# Patient Record
Sex: FEMALE
[De-identification: names, ages, dates, MRNs, and addresses within clinical notes are randomized per-mention and may not be internally consistent; named-entity substitution may affect disease eponyms.]

---

## 2019-08-08 ENCOUNTER — HOSPITAL ENCOUNTER (EMERGENCY)
Dept: HOSPITAL 53 - M ED | Age: 19
Discharge: HOME | End: 2019-08-08
Payer: COMMERCIAL

## 2019-08-08 VITALS — DIASTOLIC BLOOD PRESSURE: 65 MMHG | SYSTOLIC BLOOD PRESSURE: 115 MMHG

## 2019-08-08 VITALS — WEIGHT: 153.33 LBS | BODY MASS INDEX: 24.07 KG/M2 | HEIGHT: 67 IN
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
## 2019-08-08 DIAGNOSIS — M62.830: ICD-10-CM

## 2019-08-08 DIAGNOSIS — Z88.0: ICD-10-CM

## 2019-08-08 DIAGNOSIS — S39.012A: Primary | ICD-10-CM
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
## 2019-08-08 PROCEDURE — 96372 THER/PROPH/DIAG INJ SC/IM: CPT

## 2019-08-08 PROCEDURE — 99283 EMERGENCY DEPT VISIT LOW MDM: CPT

## 2019-08-09 ENCOUNTER — HOSPITAL ENCOUNTER (EMERGENCY)
Dept: HOSPITAL 53 - M ED | Age: 19
Discharge: HOME | End: 2019-08-09
Payer: COMMERCIAL

## 2019-08-09 VITALS
BODY MASS INDEX: 24.74 KG/M2 | DIASTOLIC BLOOD PRESSURE: 64 MMHG | HEIGHT: 67 IN | WEIGHT: 157.63 LBS | SYSTOLIC BLOOD PRESSURE: 135 MMHG
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
## 2019-08-09 DIAGNOSIS — Z88.0: ICD-10-CM

## 2019-08-09 DIAGNOSIS — S39.012A: ICD-10-CM

## 2019-08-09 DIAGNOSIS — F17.210: ICD-10-CM

## 2019-08-09 DIAGNOSIS — S29.012A: Primary | ICD-10-CM

## 2019-08-09 RX ADMIN — ACETAMINOPHEN ONE MG: 500 TABLET ORAL at 21:38

## 2019-08-09 RX ADMIN — KETOROLAC TROMETHAMINE ONE MG: 10 TABLET, FILM COATED ORAL at 21:39

## 2021-03-19 ENCOUNTER — HOSPITAL ENCOUNTER (EMERGENCY)
Dept: HOSPITAL 53 - M ED | Age: 21
Discharge: HOME | End: 2021-03-19
Payer: COMMERCIAL

## 2021-03-19 VITALS — DIASTOLIC BLOOD PRESSURE: 68 MMHG | SYSTOLIC BLOOD PRESSURE: 103 MMHG

## 2021-03-19 VITALS — BODY MASS INDEX: 24.85 KG/M2 | HEIGHT: 67 IN | WEIGHT: 158.34 LBS

## 2021-03-19 DIAGNOSIS — S93.401A: Primary | ICD-10-CM
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
## 2021-03-19 DIAGNOSIS — Z88.0: ICD-10-CM
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
## 2021-03-19 DIAGNOSIS — Z79.899: ICD-10-CM

## 2021-03-19 NOTE — REPVR
PROCEDURE INFORMATION: 

Exam: XR Right Ankle 

Exam date and time: 3/19/2021 10:46 PM 

Age: 20 years old 

Clinical indication: Other: Right ankle injury 



TECHNIQUE: 

Imaging protocol: XR Right ankle. 

Views: 3 or more views. 



COMPARISON: 

No relevant prior studies available. 



FINDINGS: 

Bones/joints: Normal. 

Soft tissues: Normal. 



IMPRESSION: 

Negative right ankle. 



Electronically signed by: Catrachito Gutierres On 03/19/2021  23:11:58 PM

## 2021-12-08 ENCOUNTER — HOSPITAL ENCOUNTER (EMERGENCY)
Dept: HOSPITAL 53 - M ED | Age: 21
Discharge: HOME | End: 2021-12-08
Payer: COMMERCIAL

## 2021-12-08 VITALS — BODY MASS INDEX: 25.97 KG/M2 | WEIGHT: 171.37 LBS | HEIGHT: 68 IN

## 2021-12-08 VITALS — DIASTOLIC BLOOD PRESSURE: 75 MMHG | SYSTOLIC BLOOD PRESSURE: 128 MMHG
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
## 2021-12-08 DIAGNOSIS — Z88.0: ICD-10-CM

## 2021-12-08 DIAGNOSIS — S39.012A: Primary | ICD-10-CM
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
## 2021-12-08 PROCEDURE — 99283 EMERGENCY DEPT VISIT LOW MDM: CPT

## 2021-12-08 PROCEDURE — 96372 THER/PROPH/DIAG INJ SC/IM: CPT

## 2021-12-08 NOTE — CCD
Summarization Of Episode

                             Created on: 2021



YOLETTE ZULUAGA

External Reference #: [id number]

: 2000

Sex: Undifferentiated



Demographics





                          Address                   [address] [place]  [address]

 

                          Home Phone                [phone]

 

                          Preferred Language        English

 

                          Marital Status            Unknown

 

                          [employer] Affiliation     Unknown

 

                          Race                      Unknown

 

                          Ethnic Group              Not  or 





Author





                          Author                    [facility]

 

                          Organization              [facility]

 

                          Address                   Unknown

 

                          Phone                     Unavailable







Support





                Name            Relationship    Address         Phone

 

                     [employer]             Next Of Kin         [address]

[place]

[place]  [address]                    Unavailable

 

                    DEDE  MARILUZ    Next Of Kin         [address] 

[place]  [address]                      [phone]







Care Team Providers





                    Care Team Member Name Role                Phone

 

                    Talbot, M Jina FNP Unavailable         Unavailable

 

                    Talbot, M Jina FNP Unavailable         Unavailable

 

                    Talbot, M Jina FNP Unavailable         Unavailable

 

                    Talbot, M Jina FNP Unavailable         Unavailable

 

                    Talbot, M Jina FNP Unavailable         Unavailable

 

                    Talbot, M Jina FNP Unavailable         Unavailable

 

                    Talbot, M Jina FNP Unavailable         Unavailable

 

                    Talbot, M Jina FNP Unavailable         Unavailable

 

                    Talbot, M Jina FNP Unavailable         Unavailable

 

                    Talbot, M Jina FNP Unavailable         Unavailable



                                  



Re-disclosure Warning

          The records that you are about to access may contain information from 
federally-assisted alcohol or drug abuse programs. If such information is 
present, then the following federally mandated warning applies: This information
has been disclosed to you from records protected by federal confidentiality 
rules (42 CFR part 2). The federal rules prohibit you from making any further 
disclosure of this information unless further disclosure is expressly permitted 
by the written consent of the person to whom it pertains or as otherwise 
permitted by 42 CFR part 2. A general authorization for the release of medical 
or other information is NOT sufficient for this purpose. The Federal rules 
restrict any use of the information to criminally investigate or prosecute any 
alcohol or drug abuse patient.The records that you are about to access may 
contain highly sensitive health information, the redisclosure of which is 
protected by Article 27-F of the [facility] Public Health law. If you 
continue you may have access to information: Regarding HIV / AIDS; Provided by 
facilities licensed or operated by the [facility] Office of Mental Health; 
or Provided by the [facility] Office for People With Developmental 
Disabilities. If such information is present, then the following [place] 
mandated warning applies: This information has been disclosed to you from 
confidential records which are protected by state law. State law prohibits you 
from making any further disclosure of this information without the specific 
written consent of the person to whom it pertains, or as otherwise permitted by 
law. Any unauthorized further disclosure in violation of state law may result in
a fine or prison sentence or both. A general authorization for the release of 
medical or other information is NOT sufficient authorization for further disc
losure.                                                                         
    



Encounters

          



           Encounter  Providers  Location   Date       Indications Data Source(s

)

 

                Outpatient      Attender: Jina WELLS [facility]

2021 09:03:00 AM

EST - 2021 09:04:00 AM [facility]

al

 

                                        Patient discharged. 



                                                                                
       



Immunizations

          



             Vaccine      Date         Status       Description  Data Source(s)

 

             COVID-19 VACCINE Moderna 2021 12:00:00 AM EDT completed      

           [facility]

 

                                        Vaccine Series Complete: YESThis Data wa

s Submitted to [facility] Via [employer]. 

 

             COVID-19 VACCINE Moderna 2021 12:00:00 AM EDT completed      

           [facility]

 

                                        Vaccine Series Complete: NOThis Data was

 Submitted to [facility] Via [employer]. 



                                                                                
                 



Medications

          No Information                                                        
 



Insurance Providers

          



             Payer name   Policy type / Coverage type Policy ID    Covered party

 ID Covered 

party's relationship to roa Policy Roa             Plan Information

 

          [facility] ACTIVE DUTY           [id number]           SP             

     [id number]

 

          [facility]           [id number]           S                   [id number]

10

 

          [facility] ACT[employer]UTY           [id number]           SP             

     [id number]



                                                                                
                           



Problems, Conditions, and Diagnoses

          No Information                                                        
                               



Surgeries/Procedures

          No Information                                                        
                     



Results

          



                    ID                  Date                Data Source

 

                    [id number]         2021 10:38:00 AM [facility]

 

                                        Name: YOLETTE ZULUAGA   : [birth year]

[birth year] Age/Sex: 21F   Ordering Provider: 

RUSSELL Medina  Med Rec #: [id number]  Account #: [id number]  Reg Status: 
[facility] Room #:   Date of Service: 21   Report Number: [id number]  
________________________________________________________________________________

__________  cc:RUSSELL Medina  Send Report To:    [id number] XRP/XR Ankle
Rt Min. 3 Views    Reason for exam: ANKLE PAIN    No priors are available for 
comparison.     FINDINGS:   Routine views show no fracture or dislocation.  The 
ankle mortise isnormally maintained and the articular surfaces are smooth.  The 
soft tissues areunremarkable.    IMPRESSION:  NEGATIVE ANKLE.    REPORT DICTATED
BY EARL LUIS, REVIEWED AND SIGNED BY DR. GABRIEL.        Fluoroscopy time in
seconds:    Number of Exposures:   Time Portable Image Performed:      Contrast 
Agent in ml:          Method of Administration:                                 
                   **REPORT SIGNATURE ON FILE**   Reported By: Earl Gabriel MD   Electronically signed by: Earl Gabriel MD 21 1045     Dictation 
Date/Time: 21 0943  Transcribed Date/Time: 21 1038  
: AGUILAR 









          Name      Value     Range     Interpretation Code Description Data Cristal

rce(s) Supporting 

Document(s)

 

                                                                       









                    ID                  Date                Data Source

 

                    [id number]         2021 11:25:00 AM EDT [facility]









          Name      Value     Range     Interpretation Code Description Data Cristal

rce(s) Supporting 

Document(s)

 

          SARS coronavirus 2 RNA Not Detected                               [facility]

[place]     

 

                                        This lab was ordered by [facility] 

[facility] and reported by [facility]. 









                    ID                  Date                Data Source

 

                    [id number]         2021 01:51:00 PM EST [facility]









          Name      Value     Range     Interpretation Code Description Data Cristal

rce(s) Supporting 

Document(s)

 

          SARS coronavirus 2 RNA Not Detected                               [facility]

[place]     

 

                                        This lab was ordered by [facility] 

[facility] and reported by [facility]. 







                                        Procedure

 

                                          



                                                                                
                                     



Social History

          No Information

## 2024-10-03 NOTE — CCD
Can we please order the 1 Day Acuvue Moist for Astigmatism contact lenses for the patient listed in her chart?   Summarization Of Episode

                             Created on: 2021



YOLETTE ZULUAGA

External Reference #: [id number]

: 2000

Sex: Undifferentiated



Demographics





                          Address                   [address]

[place]  [address]

 

                          Home Phone                [phone]

 

                          Preferred Language        English

 

                          Marital Status            Unknown

 

                          Faith Affiliation     Unknown

 

                          Race                      Unknown

 

                          Ethnic Group              Not  or 





Author





                          Author                    [facility]

 

                          Organization              [facility]

 

                          Address                   Unknown

 

                          Phone                     Unavailable







Support





                Name            Relationship    Address         Phone

 

                    [facility]             Next Of Kin         [address]

[place]

[place]  [address]                    Unavailable

 

                    MARILUZ AGUAYO    Next Of Kin         [address] [place]  [address]                      [phone]







Care Team Providers





                    Care Team Member Name Role                Phone

 

                    Talbot, M Jina FNP Unavailable         Unavailable

 

                    Talbot, M Jina FNP Unavailable         Unavailable

 

                    Talbot, M Jina FNP Unavailable         Unavailable

 

                    Talbot, M Jina FNP Unavailable         Unavailable

 

                    Talbot, M Jina FNP Unavailable         Unavailable

 

                    Talbot, M Jina FNP Unavailable         Unavailable

 

                    Talbot, M Jina FNP Unavailable         Unavailable

 

                    Talbot, M Jina FNP Unavailable         Unavailable

 

                    Talbot, M Jina FNP Unavailable         Unavailable

 

                    Talbot, M Jina FNP Unavailable         Unavailable



                                  



Re-disclosure Warning

          The records that you are about to access may contain information from 
federally-assisted alcohol or drug abuse programs. If such information is 
present, then the following federally mandated warning applies: This information
has been disclosed to you from records protected by federal confidentiality 
rules (42 CFR part 2). The federal rules prohibit you from making any further 
disclosure of this information unless further disclosure is expressly permitted 
by the written consent of the person to whom it pertains or as otherwise 
permitted by 42 CFR part 2. A general authorization for the release of medical 
or other information is NOT sufficient for this purpose. The Federal rules 
restrict any use of the information to criminally investigate or prosecute any 
alcohol or drug abuse patient.The records that you are about to access may 
contain highly sensitive health information, the redisclosure of which is 
protected by Article 27-F of the [facility] Public Health law. If you 
continue you may have access to information: Regarding HIV / AIDS; Provided by 
facilities licensed or operated by the [facility] Office of Mental Health; 
or Provided by the [facility] Office for People With Developmental 
Disabilities. If such information is present, then the following [place] 
mandated warning applies: This information has been disclosed to you from 
confidential records which are protected by state law. State law prohibits you 
from making any further disclosure of this information without the specific 
written consent of the person to whom it pertains, or as otherwise permitted by 
law. Any unauthorized further disclosure in violation of state law may result in
a fine or assisted sentence or both. A general authorization for the release of 
medical or other information is NOT sufficient authorization for further disc
losure.                                                                         
    



Encounters

          



           Encounter  Providers  Location   Date       Indications Data Source(s

)

 

                Outpatient      Attender: Jina WELLS [facility]

2021 09:03:00 AM

EST - 2021 09:04:00 AM [facility]

al

 

                                        Patient discharged. 



                                                                                
       



Immunizations

          



             Vaccine      Date         Status       Description  Data Source(s)

 

             COVID-19 VACCINE Moderna 2021 12:00:00 AM EDT completed      

           [facility]

 

                                        Vaccine Series Complete: YESThis Data wa

s Submitted to [facility] Via [employer]. 

 

             COVID-19 VACCINE Moderna 2021 12:00:00 AM EDT completed      

           [facility]

 

                                        Vaccine Series Complete: NOThis Data was

 Submitted to [facility] [employer]. 



                                                                                
                 



Medications

          No Information                                                        
 



Insurance Providers

          



             Payer name   Policy type / Coverage type Policy ID    Covered party

 ID Covered 

party's relationship to roa Policy Roa             Plan Information

 

          [facility] [employer]           [id number]           SP             

     [id number]

 

          [facility]           [id number]           S                   [id number]

10

 

          [facility] [employer]           [id number]           SP             

     [id number]



                                                                                
                           



Problems, Conditions, and Diagnoses

          No Information                                                        
                               



Surgeries/Procedures

          No Information                                                        
                     



Results

          



                    ID                  Date                Data Source

 

                    [id number]         2021 10:38:00 AM [facility]

 

                                        Name: YOLETTE ZULUAGA   : [birth year]

[birth year] Age/Sex: 21F   Ordering Provider: 

RUSSELL Mednia  Med Rec #: [id number]  Account #: [id number]  Reg Status: 
[facility] Room #:   Date of Service: 21   Report Number: [id number]  
________________________________________________________________________________

__________  cc:RUSSELL Medina  Send Report To:    [id number] XRP/XR Ankle
Rt Min. 3 Views    Reason for exam: ANKLE PAIN    No priors are available for 
comparison.     FINDINGS:   Routine views show no fracture or dislocation.  The 
ankle mortise isnormally maintained and the articular surfaces are smooth.  The 
soft tissues areunremarkable.    IMPRESSION:  NEGATIVE ANKLE.    REPORT DICTATED
BY EARL LUIS, REVIEWED AND SIGNED BY DR. GABRIEL.        Fluoroscopy time in
seconds:    Number of Exposures:   Time Portable Image Performed:      Contrast 
Agent in ml:          Method of Administration:                                 
                   **REPORT SIGNATURE ON FILE**   Reported By: Earl Gabriel MD   Electronically signed by: Earl Gabriel MD 21 1045     Dictation 
Date/Time: 21 0943  Transcribed Date/Time: 21 1038  
: AGUILAR 









          Name      Value     Range     Interpretation Code Description Data Cristal

rce(s) Supporting 

Document(s)

 

                                                                       









                    ID                  Date                Data Source

 

                    [id number]         2021 11:25:00 AM EDT [facility]









          Name      Value     Range     Interpretation Code Description Data Cristal

rce(s) Supporting 

Document(s)

 

          SARS coronavirus 2 RNA Not Detected                               [facility]

[place]     

 

                                        This lab was ordered by [facility] 

[facility] and reported by [facility]. 









                    ID                  Date                Data Source

 

                    [id number]         2021 01:51:00 PM EST [facility]









          Name      Value     Range     Interpretation Code Description Data Cristal

rce(s) Supporting 

Document(s)

 

          SARS coronavirus 2 RNA Not Detected                               [facility]

[place]     

 

                                        This lab was ordered by [facility] 

[facility] and reported by [facility]. 







                                        Procedure

 

                                          



                                                                                
                                     



Social History

          No Information